# Patient Record
(demographics unavailable — no encounter records)

---

## 2025-03-29 NOTE — PHYSICAL EXAM
[FreeTextEntry1] : Gait: Presents ambulating independently without signs of antalgia.  Good coordination and balance noted. GENERAL: alert, cooperative, in NAD SKIN: The skin is intact, warm, pink and dry over the area examined. EYES: Normal conjunctiva, normal eyelids and pupils were equal and round. ENT: normal ears, normal nose and normal lips. CARDIOVASCULAR: brisk capillary refill, but no peripheral edema. RESPIRATORY: The patient is in no apparent respiratory distress. They're taking full deep breaths without use of accessory muscles or evidence of audible wheezes or stridor without the use of a stethoscope. Normal respiratory effort. ABDOMEN: not examined  Focused exam LUE - Cast is clean, dry, intact. Good condition. - No skin irritation or breakdown at the cast edges - Able to actively flex and extend all fingers - Able to perform a thumbs up maneuver (PIN), OK sign (AIN), finger crossover (ulnar) - Fingers are warm and appear well perfused with brisk capillary refill - Examination of pulses is deferred due to overlying cast material - Sensation is grossly intact to all exposed portions of the upper extremity - No evidence of lymphedema
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
## 2025-03-29 NOTE — DATA REVIEWED
[de-identified] : XR left wrist 3 views IN cast 3/26/25: +distal radius fracture in acceptable alignment. No interval healing or callus formation.
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
## 2025-03-29 NOTE — CONSULT LETTER
[Dear  ___] : Dear  [unfilled], [Consult Letter:] : I had the pleasure of evaluating your patient, [unfilled]. [Please see my note below.] : Please see my note below. [Consult Closing:] : Thank you very much for allowing me to participate in the care of this patient.  If you have any questions, please do not hesitate to contact me. [Sincerely,] : Sincerely, [FreeTextEntry3] : Valerio Abraham MD Division of Pediatric Orthopaedics and Rehabilitation  [address], [address] [phone] fax: [phone]

## 2025-03-29 NOTE — DATA REVIEWED
[de-identified] : XR left wrist 3 views IN cast 3/26/25: +distal radius fracture in acceptable alignment. No interval healing or callus formation.

## 2025-03-29 NOTE — CONSULT LETTER
[Dear  ___] : Dear  [unfilled], [Consult Letter:] : I had the pleasure of evaluating your patient, [unfilled]. [Please see my note below.] : Please see my note below. [Consult Closing:] : Thank you very much for allowing me to participate in the care of this patient.  If you have any questions, please do not hesitate to contact me. [Sincerely,] : Sincerely, [FreeTextEntry3] : Valerio Abraham MD Division of Pediatric Orthopaedics and Rehabilitation  [address], [address] [phone] fax: [phone]
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
## 2025-04-10 NOTE — DATA REVIEWED
[de-identified] : XR left wrist 3 views out of the cast today reveal bridging callus formation of the distal radius fracture in acceptable alignment. Good overall alignment.

## 2025-04-10 NOTE — DATA REVIEWED
[de-identified] : XR left wrist 3 views out of the cast today reveal bridging callus formation of the distal radius fracture in acceptable alignment. Good overall alignment.

## 2025-04-10 NOTE — PHYSICAL EXAM
[FreeTextEntry1] : Gait: Presents ambulating independently without signs of antalgia.  Good coordination and balance noted. GENERAL: alert, cooperative, in NAD SKIN: The skin is intact, warm, pink and dry over the area examined. EYES: Normal conjunctiva, normal eyelids and pupils were equal and round. ENT: normal ears, normal nose and normal lips. CARDIOVASCULAR: brisk capillary refill, but no peripheral edema. RESPIRATORY: The patient is in no apparent respiratory distress. They're taking full deep breaths without use of accessory muscles or evidence of audible wheezes or stridor without the use of a stethoscope. Normal respiratory effort. ABDOMEN: not examined  Focused exam LUE - Cast is clean, dry, intact. Good condition. - No skin irritation or breakdown at the cast edges -No clinical deformity -no tenderness over the fx site.  -limited ROM due to cast stiffness at the wrist.  - Able to actively flex and extend all fingers - Able to perform a thumbs up maneuver (PIN), OK sign (AIN), finger crossover (ulnar) - Fingers are warm and appear well perfused with brisk capillary refill - Sensation is grossly intact to all exposed portions of the upper extremity
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
